# Patient Record
Sex: FEMALE | Race: WHITE | HISPANIC OR LATINO | Employment: FULL TIME | URBAN - NONMETROPOLITAN AREA
[De-identification: names, ages, dates, MRNs, and addresses within clinical notes are randomized per-mention and may not be internally consistent; named-entity substitution may affect disease eponyms.]

---

## 2022-10-20 ENCOUNTER — OFFICE VISIT (OUTPATIENT)
Dept: URGENT CARE | Facility: CLINIC | Age: 27
End: 2022-10-20
Payer: COMMERCIAL

## 2022-10-20 VITALS
RESPIRATION RATE: 17 BRPM | OXYGEN SATURATION: 98 % | TEMPERATURE: 99.1 F | SYSTOLIC BLOOD PRESSURE: 130 MMHG | DIASTOLIC BLOOD PRESSURE: 70 MMHG | HEART RATE: 98 BPM

## 2022-10-20 DIAGNOSIS — J02.9 SORE THROAT: Primary | ICD-10-CM

## 2022-10-20 PROCEDURE — 96372 THER/PROPH/DIAG INJ SC/IM: CPT | Performed by: PHYSICIAN ASSISTANT

## 2022-10-20 PROCEDURE — 99214 OFFICE O/P EST MOD 30 MIN: CPT | Performed by: PHYSICIAN ASSISTANT

## 2022-10-20 RX ORDER — METHYLPREDNISOLONE SODIUM SUCCINATE 125 MG/2ML
125 INJECTION, POWDER, LYOPHILIZED, FOR SOLUTION INTRAMUSCULAR; INTRAVENOUS ONCE
Status: COMPLETED | OUTPATIENT
Start: 2022-10-20 | End: 2022-10-20

## 2022-10-20 RX ORDER — FEXOFENADINE HCL 180 MG/1
180 TABLET ORAL EVERY MORNING
COMMUNITY
Start: 2022-09-29

## 2022-10-20 RX ORDER — AZELASTINE HCL 205.5 UG/1
SPRAY NASAL
COMMUNITY
Start: 2022-09-08

## 2022-10-20 RX ADMIN — METHYLPREDNISOLONE SODIUM SUCCINATE 125 MG: 125 INJECTION, POWDER, LYOPHILIZED, FOR SOLUTION INTRAMUSCULAR; INTRAVENOUS at 18:45

## 2022-10-20 NOTE — PATIENT INSTRUCTIONS
Salt water gargles  Tylenol for discomfort  Closely monitor for worsening symptoms  If you have difficulty breathing or swallowing, administer epinephrine and call 911 ASAP  Follow up with PCP in 3-5 days  Proceed to  ER if symptoms worsen

## 2022-10-20 NOTE — PROGRESS NOTES
[facility]        NAME: Damian May is a 24 y o  female  : 1995    MRN: [id number]  DATE: 2022  TIME: 6:50 PM    Assessment and Plan   Sore throat [J02 9]  1  Sore throat  methylPREDNISolone sodium succinate (Solu-MEDROL) injection 125 mg     Patient is talking normally, NAD, and denies difficulty breathing or swallowing  States this occurred slowly and doesn't feel like an anaphylactic reaction  Discussed risk of possible anaphylactic reaction  Recommended treatment and ambulance transfer to ED  Additionally discussed the possibility of early viral pharyngitis symptoms and lymph node swelling as this may cause a sensation of throat swelling  Throat and lung examination normal  Explained to patient that we can trial with a IM injection of a steroid  Patient has an epi pen that she keeps with her at all times  She knows how to use it  Patient would like to try the steroid and will monitor for worsening symptoms  Additionally recommended she have her sister stay with her tonight  If symptoms worsen, she knows to administer epi pen ASAP and then call 911  Solumedrol administered in office  Patient Instructions     Salt water gargles  Tylenol for discomfort  Closely monitor for worsening symptoms  If you have difficulty breathing or swallowing, administer epinephrine and call 911 ASAP  Follow up with PCP in 3-5 days  Proceed to  ER if symptoms worsen  Chief Complaint     Chief Complaint   Patient presents with   • Swelling     Throat swelling, difficulty swallowing liquids, feels like I got kicked in throat  No new detergents, or medication           History of Present Illness       Patient states she woke up this morning with the feeling like "my lymph nodes were swollen"  Reports gradual onset of worsening swelling sensation neck/back of tongue  Reports discomfort stating, "I feel like I got kicked in the throat"   She is now experiencing discomfort while swallowing liquids but she is able to swallow  Additionally reports using flonase this morning, stating it may have caused irritation as it dripped down the back of her throat  Denies current lip swelling, rashes, itching, drooling, difficulty breathing, dizziness, heart palpitations, chest pain, nausea, vomiting, fever or other URI sx  Reports anaphylactic reaction to peanuts, tree nuts and kiwi extract but does not automatically recall consuming anything with those allergens  She took allegra for symptoms today  States this does not feel like an anaphylactic reactions  Denies new medications or foods  No recent surgeries where intubated  Denies hx/o angioedema  Review of Systems   Review of Systems   Constitutional: Negative for chills, fatigue and fever  HENT: Positive for sore throat  Negative for congestion, ear discharge, ear pain, postnasal drip, rhinorrhea, sinus pressure, sinus pain, sneezing and trouble swallowing  Respiratory: Negative for cough, chest tightness, shortness of breath and wheezing  Gastrointestinal: Negative for abdominal pain, diarrhea, nausea and vomiting  Musculoskeletal: Negative for myalgias  Skin: Negative for rash  Neurological: Negative for light-headedness and headaches  Current Medications       Current Outpatient Medications:   •  Azelastine HCl 0 15 % SOLN, ADMINISTER 1 SPRAY 2 TIMES A DAY IN NOSTRIL AS NEEDED FOR RHINITIS ( ALLERGIES OR CONGESTION), Disp: , Rfl:   •  fexofenadine (ALLEGRA) 180 MG tablet, Take 180 mg by mouth every morning, Disp: , Rfl:   No current facility-administered medications for this visit      Current Allergies     Allergies as of 10/20/2022 - Reviewed 10/20/2022   Allergen Reaction Noted   • Kiwi extract - food allergy Anaphylaxis 10/20/2022   • Nuts - food allergy Anaphylaxis 01/15/2020   • Peanut-containing drug products - food allergy Anaphylaxis 10/20/2022            The following portions of the patient's history were reviewed and updated as appropriate: allergies, current medications, past family history, past medical history, past social history, past surgical history and problem list      Past Medical History:   Diagnosis Date   • Allergic        Past Surgical History:   Procedure Laterality Date   • WISDOM TOOTH EXTRACTION         No family history on file  Medications have been verified  Objective   /70   Pulse 98   Temp 99 1 °F (37 3 °C)   Resp 17   SpO2 98%   No LMP recorded  Physical Exam     Physical Exam  Vitals reviewed  Constitutional:       General: She is not in acute distress  Appearance: She is well-developed  She is not diaphoretic  HENT:      Head: Normocephalic and atraumatic  Nose: Nose normal       Mouth/Throat:      Mouth: Mucous membranes are moist       Pharynx: Oropharynx is clear  No oropharyngeal exudate or posterior oropharyngeal erythema  Comments: No tonsillar hypertrophy, exudate or uvular deviation  Cardiovascular:      Rate and Rhythm: Normal rate and regular rhythm  Heart sounds: Normal heart sounds  No murmur heard  No friction rub  No gallop  Pulmonary:      Effort: Pulmonary effort is normal  No respiratory distress  Breath sounds: Normal breath sounds  No wheezing or rales  Chest:      Chest wall: No tenderness  Musculoskeletal:      Cervical back: Normal range of motion  Lymphadenopathy:      Cervical: No cervical adenopathy  Skin:     General: Skin is warm  Findings: No rash  Neurological:      Mental Status: She is alert  Psychiatric:         Behavior: Behavior normal          Thought Content:  Thought content normal          Judgment: Judgment normal

## 2023-12-22 ENCOUNTER — OFFICE VISIT (OUTPATIENT)
Dept: URGENT CARE | Facility: CLINIC | Age: 28
End: 2023-12-22
Payer: COMMERCIAL

## 2023-12-22 VITALS
OXYGEN SATURATION: 97 % | WEIGHT: 219.8 LBS | BODY MASS INDEX: 33.31 KG/M2 | HEART RATE: 80 BPM | RESPIRATION RATE: 18 BRPM | DIASTOLIC BLOOD PRESSURE: 80 MMHG | TEMPERATURE: 98.1 F | HEIGHT: 68 IN | SYSTOLIC BLOOD PRESSURE: 114 MMHG

## 2023-12-22 DIAGNOSIS — N39.0 URINARY TRACT INFECTION WITHOUT HEMATURIA, SITE UNSPECIFIED: ICD-10-CM

## 2023-12-22 DIAGNOSIS — R35.0 FREQUENT URINATION: Primary | ICD-10-CM

## 2023-12-22 LAB
SL AMB  POCT GLUCOSE, UA: ABNORMAL
SL AMB LEUKOCYTE ESTERASE,UA: ABNORMAL
SL AMB POCT BILIRUBIN,UA: ABNORMAL
SL AMB POCT BLOOD,UA: ABNORMAL
SL AMB POCT CLARITY,UA: ABNORMAL
SL AMB POCT COLOR,UA: YELLOW
SL AMB POCT KETONES,UA: ABNORMAL
SL AMB POCT NITRITE,UA: ABNORMAL
SL AMB POCT PH,UA: 6.5
SL AMB POCT SPECIFIC GRAVITY,UA: 1
SL AMB POCT URINE PROTEIN: ABNORMAL
SL AMB POCT UROBILINOGEN: 4

## 2023-12-22 PROCEDURE — 87077 CULTURE AEROBIC IDENTIFY: CPT

## 2023-12-22 PROCEDURE — 87591 N.GONORRHOEAE DNA AMP PROB: CPT

## 2023-12-22 PROCEDURE — 87491 CHLMYD TRACH DNA AMP PROBE: CPT

## 2023-12-22 PROCEDURE — 99213 OFFICE O/P EST LOW 20 MIN: CPT

## 2023-12-22 PROCEDURE — 81002 URINALYSIS NONAUTO W/O SCOPE: CPT

## 2023-12-22 PROCEDURE — 87186 SC STD MICRODIL/AGAR DIL: CPT

## 2023-12-22 PROCEDURE — 87086 URINE CULTURE/COLONY COUNT: CPT

## 2023-12-22 RX ORDER — NITROFURANTOIN 25; 75 MG/1; MG/1
100 CAPSULE ORAL 2 TIMES DAILY
Qty: 10 CAPSULE | Refills: 0 | Status: SHIPPED | OUTPATIENT
Start: 2023-12-22 | End: 2023-12-27

## 2023-12-22 RX ORDER — PROPRANOLOL HYDROCHLORIDE 10 MG/1
10 TABLET ORAL
COMMUNITY
Start: 2023-12-11

## 2023-12-22 RX ORDER — PHENOL 1.4 %
AEROSOL, SPRAY (ML) MUCOUS MEMBRANE
COMMUNITY

## 2023-12-22 RX ORDER — OMEPRAZOLE 20 MG/1
CAPSULE, DELAYED RELEASE ORAL
COMMUNITY
Start: 2023-09-06

## 2023-12-22 RX ORDER — CYANOCOBALAMIN 1000 UG/ML
1000 INJECTION, SOLUTION INTRAMUSCULAR; SUBCUTANEOUS
COMMUNITY
Start: 2023-12-19 | End: 2025-02-11

## 2023-12-22 NOTE — PROGRESS NOTES
[facility]        NAME: Dafne Huerta is a 28 y.o. female  : 1995    MRN: [id number]  DATE: 2023  TIME: 6:02 PM    Assessment and Plan   Frequent urination [R35.0]  1. Frequent urination  POCT urine dip    Urine culture    Chlamydia/GC amplified DNA by PCR    Trichomonas vaginalis/Mycoplasma genitalium PCR    nitrofurantoin (MACROBID) 100 mg capsule      2. Urinary tract infection without hematuria, site unspecified  nitrofurantoin (MACROBID) 100 mg capsule        Burning with urination, odor in urination, and frequent urination.  Symptoms began on .  Recently had sexual intercourse with a new partner.  Unsure if he had symptoms.  She would like to be tested for STDs.  She denies any vaginal discharge or pelvic pain.  Just got over her last normal menstrual period, denies pregnancy.  Sending for urine culture, chlamydia/gonorrhea, and trichomoniasis vaginalis/mycoplasma genitalium.  Advise follow-up family doctor.  Advised to go to the ER if symptoms worsen.    Patient Instructions     Take prescribed antibiotic as instructed.  Make sure to drink plenty of fluids and rest.  May try over-the-counter Azo for bladder discomfort.  Avoid sexual activities until STD testing returns.  Check MyChart in 2 days for results of urine culture and STDs.  Will be out of the office until .  You may call our office on , we will be open 8 AM to 4 PM.  Any symptoms worsen, go to the emergency room.  Follow-up with family doctor.  Follow up with PCP in 3-5 days.  Proceed to  ER if symptoms worsen.    Chief Complaint     Chief Complaint   Patient presents with    Possible UTI     Symptoms started this morning and had a strong odor, burning and frequent urine. Had a sex partner on Tuesday and she is worried was wanting to get tested for stds. Last menstrual cycle was last Tuesday.          History of Present Illness       28-year-old female presents to the clinic for urinary frequency,  urgency, foul odor, and burning that began on 12/19.  History of UTIs in the past.  States that Macrobid works well for her.  Admits to new sexual partner on Tuesday.  Would like to be tested for STDs.  Denies any pelvic pain or vaginal discharge.  PT states that she is unsure of her partner having symptoms.  Denies any fever, chills, chest pain, shortness of breath, Marshall pain, flank pain, hematuria.    Difficulty Urinating   This is a new problem. The current episode started today. The problem occurs every urination. The problem has been gradually worsening. The quality of the pain is described as shooting and stabbing. The pain is at a severity of 6/10. There has been no fever. She is Sexually active. There is No history of pyelonephritis. Associated symptoms include a discharge, frequency, hesitancy and urgency. Pertinent negatives include no chills, flank pain, hematuria, nausea, possible pregnancy or vomiting.       Review of Systems   Review of Systems   Constitutional: Negative.  Negative for chills.   HENT: Negative.     Respiratory: Negative.     Cardiovascular: Negative.    Gastrointestinal: Negative.  Negative for nausea and vomiting.   Genitourinary:  Positive for dysuria, frequency, hesitancy and urgency. Negative for flank pain and hematuria.   Musculoskeletal: Negative.    Skin: Negative.    Neurological: Negative.          Current Medications       Current Outpatient Medications:     Azelastine HCl 0.15 % SOLN, ADMINISTER 1 SPRAY 2 TIMES A DAY IN NOSTRIL AS NEEDED FOR RHINITIS ( ALLERGIES OR CONGESTION), Disp: , Rfl:     cyanocobalamin 1,000 mcg/mL, Inject 1,000 mcg into a muscle, Disp: , Rfl:     fexofenadine (ALLEGRA) 180 MG tablet, Take 180 mg by mouth every morning, Disp: , Rfl:     nitrofurantoin (MACROBID) 100 mg capsule, Take 1 capsule (100 mg total) by mouth 2 (two) times a day for 5 days, Disp: 10 capsule, Rfl: 0    omeprazole (PriLOSEC) 20 mg delayed release capsule, Take 1 cap by mouth  "daily for 90 days after surgery., Disp: , Rfl:     propranolol (INDERAL) 10 mg tablet, Take 10 mg by mouth, Disp: , Rfl:     Multiple Vitamins-Minerals (Multivitamin Women) TABS, Take by mouth, Disp: , Rfl:     Current Allergies     Allergies as of 12/22/2023 - Reviewed 12/22/2023   Allergen Reaction Noted    Kiwi extract - food allergy Anaphylaxis 10/20/2022    Nuts - food allergy Anaphylaxis 01/15/2020    Peanut-containing drug products - food allergy Anaphylaxis 10/20/2022            The following portions of the patient's history were reviewed and updated as appropriate: allergies, current medications, past family history, past medical history, past social history, past surgical history and problem list.     Past Medical History:   Diagnosis Date    Allergic        Past Surgical History:   Procedure Laterality Date    WISDOM TOOTH EXTRACTION         History reviewed. No pertinent family history.      Medications have been verified.        Objective   /80   Pulse 80   Temp 98.1 °F (36.7 °C)   Resp 18   Ht 5' 8\" (1.727 m)   Wt 99.7 kg (219 lb 12.8 oz)   SpO2 97%   BMI 33.42 kg/m²        Physical Exam     Physical Exam  Constitutional:       General: She is awake. She is not in acute distress.     Appearance: Normal appearance. She is not ill-appearing, toxic-appearing or diaphoretic.   HENT:      Head: Normocephalic and atraumatic.      Mouth/Throat:      Mouth: Mucous membranes are moist.      Pharynx: Oropharynx is clear. No posterior oropharyngeal erythema.   Eyes:      Extraocular Movements: Extraocular movements intact.      Pupils: Pupils are equal, round, and reactive to light.   Cardiovascular:      Rate and Rhythm: Normal rate and regular rhythm.      Pulses: Normal pulses.      Heart sounds: Normal heart sounds.   Pulmonary:      Effort: Pulmonary effort is normal. No respiratory distress.      Breath sounds: Normal breath sounds.   Abdominal:      General: Abdomen is flat. Bowel sounds are " normal. There is no distension.      Palpations: Abdomen is soft.      Tenderness: There is no abdominal tenderness. There is no right CVA tenderness, left CVA tenderness, guarding or rebound.   Musculoskeletal:      Cervical back: Normal range of motion and neck supple.   Lymphadenopathy:      Cervical: No cervical adenopathy.   Skin:     General: Skin is warm and dry.      Capillary Refill: Capillary refill takes less than 2 seconds.   Neurological:      General: No focal deficit present.      Mental Status: She is alert and easily aroused. Mental status is at baseline.   Psychiatric:         Mood and Affect: Mood normal.         Behavior: Behavior normal. Behavior is cooperative.

## 2023-12-22 NOTE — PATIENT INSTRUCTIONS
Take prescribed antibiotic as instructed.  Make sure to drink plenty of fluids and rest.  May try over-the-counter Azo for bladder discomfort.  Avoid sexual activities until STD testing returns.  Check MyChart in 2 days for results of urine culture and STDs.  Will be out of the office until 12/26.  You may call our office on 12/24, we will be open 8 AM to 4 PM.  Any symptoms worsen, go to the emergency room.  Follow-up with family doctor.  Follow up with PCP in 3-5 days.  Proceed to  ER if symptoms worsen.  Urinary Tract Infection in Women   WHAT YOU NEED TO KNOW:   A urinary tract infection (UTI) is caused by bacteria that get inside your urinary tract. Your urinary tract includes your kidneys, ureters, bladder, and urethra. A UTI is more common in your lower urinary tract, which includes your bladder and urethra.       DISCHARGE INSTRUCTIONS:   Return to the emergency department if:   You are urinating very little or not at all.    You have a high fever with shaking chills.    You have side or back pain that gets worse.    Call your doctor if:   You have a fever.    You do not feel better after 2 days of taking antibiotics.    You have new symptoms, such as blood or pus in your urine.    You are vomiting.    You have questions or concerns about your condition or care.    Medicines:   Antibiotics  treat a bacterial infection. If you have UTIs often (called recurrent UTIs), you may be given antibiotics to take regularly. You will be given directions for when and how to use antibiotics. The goal is to prevent UTIs but not cause antibiotic resistance by using antibiotics too often.    Medicines  may be given to decrease pain and burning when you urinate. They will also help decrease the feeling that you need to urinate often. These medicines may make your urine orange or red.    Take your medicine as directed.  Contact your healthcare provider if you think your medicine is not helping or if you have side effects. Tell  your provider if you are allergic to any medicine. Keep a list of the medicines, vitamins, and herbs you take. Include the amounts, and when and why you take them. Bring the list or the pill bottles to follow-up visits. Carry your medicine list with you in case of an emergency.    Follow up with your doctor as directed:  Write down your questions so you remember to ask them during your visits.  Prevent another UTI:   Empty your bladder often.  Urinate and empty your bladder as soon as you feel the need. Do not hold your urine for long periods of time.    Wipe from front to back after you urinate or have a bowel movement.  This will help prevent germs from getting into your urinary tract through your urethra.    Drink liquids as directed.  Ask how much liquid to drink each day and which liquids are best for you. You may need to drink more liquids than usual to help flush out the bacteria. Do not drink alcohol, caffeine, or citrus juices. These can irritate your bladder and increase your symptoms. Your healthcare provider may recommend cranberry juice to help prevent a UTI.    Urinate before and after you have sex.  This can help flush out bacteria passed during sex.    Do not douche or use feminine deodorants.  These can change the chemical balance in your vagina.    Change sanitary pads or tampons often.  This will help prevent germs from getting into your urinary tract.    Talk to your healthcare provider about your birth control method.  You may need to change your method if it is increasing your risk for UTIs.    Wear cotton underwear and clothes that are loose.  Tight pants and nylon underwear can trap moisture and cause bacteria to grow.    Vaginal estrogen may be recommended.  This medicine helps prevent UTIs in women who have gone through menopause or are in moshe-menopause.    Do pelvic muscle exercises often.  Pelvic muscle exercises may help you start and stop urinating. Strong pelvic muscles may help you  empty your bladder easier. Squeeze these muscles tightly for 5 seconds like you are trying to hold back urine. Then relax for 5 seconds. Gradually work up to squeezing for 10 seconds. Do 3 sets of 15 repetitions a day, or as directed.    © Copyright [employer] 2023 Information is for End User's use only and may not be sold, redistributed or otherwise used for commercial purposes.  The above information is an  only. It is not intended as medical advice for individual conditions or treatments. Talk to your doctor, nurse or pharmacist before following any medical regimen to see if it is safe and effective for you.

## 2023-12-23 LAB
C TRACH DNA SPEC QL NAA+PROBE: NEGATIVE
N GONORRHOEA DNA SPEC QL NAA+PROBE: NEGATIVE

## 2023-12-24 LAB — BACTERIA UR CULT: ABNORMAL

## 2024-06-07 ENCOUNTER — OFFICE VISIT (OUTPATIENT)
Dept: URGENT CARE | Facility: CLINIC | Age: 29
End: 2024-06-07
Payer: COMMERCIAL

## 2024-06-07 VITALS — TEMPERATURE: 98 F | HEART RATE: 65 BPM | DIASTOLIC BLOOD PRESSURE: 54 MMHG | SYSTOLIC BLOOD PRESSURE: 103 MMHG

## 2024-06-07 DIAGNOSIS — N30.01 ACUTE CYSTITIS WITH HEMATURIA: Primary | ICD-10-CM

## 2024-06-07 LAB
SL AMB  POCT GLUCOSE, UA: NEGATIVE
SL AMB LEUKOCYTE ESTERASE,UA: ABNORMAL
SL AMB POCT BILIRUBIN,UA: NEGATIVE
SL AMB POCT BLOOD,UA: ABNORMAL
SL AMB POCT CLARITY,UA: CLEAR
SL AMB POCT COLOR,UA: YELLOW
SL AMB POCT KETONES,UA: NEGATIVE
SL AMB POCT NITRITE,UA: NEGATIVE
SL AMB POCT PH,UA: 7.5
SL AMB POCT SPECIFIC GRAVITY,UA: 1
SL AMB POCT URINE HCG: NEGATIVE
SL AMB POCT URINE PROTEIN: ABNORMAL
SL AMB POCT UROBILINOGEN: 1

## 2024-06-07 PROCEDURE — 99213 OFFICE O/P EST LOW 20 MIN: CPT | Performed by: NURSE PRACTITIONER

## 2024-06-07 PROCEDURE — 87186 SC STD MICRODIL/AGAR DIL: CPT | Performed by: NURSE PRACTITIONER

## 2024-06-07 PROCEDURE — 87086 URINE CULTURE/COLONY COUNT: CPT | Performed by: NURSE PRACTITIONER

## 2024-06-07 PROCEDURE — 81025 URINE PREGNANCY TEST: CPT | Performed by: NURSE PRACTITIONER

## 2024-06-07 PROCEDURE — 81002 URINALYSIS NONAUTO W/O SCOPE: CPT | Performed by: NURSE PRACTITIONER

## 2024-06-07 PROCEDURE — 87077 CULTURE AEROBIC IDENTIFY: CPT | Performed by: NURSE PRACTITIONER

## 2024-06-07 RX ORDER — NITROFURANTOIN 25; 75 MG/1; MG/1
100 CAPSULE ORAL 2 TIMES DAILY
Qty: 10 CAPSULE | Refills: 0 | Status: SHIPPED | OUTPATIENT
Start: 2024-06-07 | End: 2024-06-12

## 2024-06-07 NOTE — PATIENT INSTRUCTIONS
Take antibiotic as directed.  Recommend drinking plenty of fluids including water and cranberry juice.  Recommend avoiding caffeine or alcohol.  Empty bladder frequently.  If you develop any high fever, severe back pain, abdominal pain, nausea, vomiting or any concerning symptoms please return proceed ER.  Recommend following up with PCP in 3-5 days

## 2024-06-07 NOTE — PROGRESS NOTES
"  [facility]        NAME: Dafne Huerta is a 29 y.o. female  : 1995    MRN: [id number]  DATE: 2024  TIME: 1:46 PM    Assessment and Plan   Acute cystitis with hematuria [N30.01]  1. Acute cystitis with hematuria  POCT urine dip    POCT urine HCG    Urine culture    nitrofurantoin (MACROBID) 100 mg capsule    Urine culture            Patient Instructions     Patient Instructions   Take antibiotic as directed.  Recommend drinking plenty of fluids including water and cranberry juice.  Recommend avoiding caffeine or alcohol.  Empty bladder frequently.  If you develop any high fever, severe back pain, abdominal pain, nausea, vomiting or any concerning symptoms please return proceed ER.  Recommend following up with PCP in 3-5 days        If tests have been performed at [facility], our office will contact you with results if changes need to be made to the care plan discussed with you at the visit.  [facility] on [facility]    Chief Complaint     Chief Complaint   Patient presents with    Urinary Tract Infection     C/o urinary frequency, dysuria, and odor onset \"last night\". Denies flank/lower back pain. Denies any visible hematuria. Denies fever. Denies any OTC medications.         History of Present Illness       Difficulty Urinating   This is a recurrent problem. The current episode started yesterday. The problem occurs every urination. The problem has been rapidly worsening. The quality of the pain is described as burning. The pain is at a severity of 7/10. The pain is moderate. There has been no fever. She is Sexually active. There is No history of pyelonephritis. Associated symptoms include frequency, hematuria and urgency. Pertinent negatives include no chills, discharge, flank pain, hesitancy, nausea, sweats or vomiting. She has tried nothing for the symptoms. The treatment provided no relief. There is no history of kidney stones or recurrent UTIs.       Review " of Systems   Review of Systems   Constitutional:  Negative for chills, diaphoresis, fatigue and fever.   Respiratory:  Negative for cough, chest tightness and shortness of breath.    Cardiovascular:  Negative for chest pain and palpitations.   Gastrointestinal:  Negative for abdominal pain, diarrhea, nausea and vomiting.   Genitourinary:  Positive for dysuria, frequency, hematuria and urgency. Negative for decreased urine volume, difficulty urinating, flank pain, hesitancy, pelvic pain, vaginal bleeding, vaginal discharge and vaginal pain.   Musculoskeletal:  Negative for back pain, joint swelling, myalgias, neck pain and neck stiffness.   Skin:  Negative for rash.   Neurological:  Negative for dizziness, weakness, numbness and headaches.         Current Medications       Current Outpatient Medications:     nitrofurantoin (MACROBID) 100 mg capsule, Take 1 capsule (100 mg total) by mouth 2 (two) times a day for 5 days, Disp: 10 capsule, Rfl: 0    Azelastine HCl 0.15 % SOLN, ADMINISTER 1 SPRAY 2 TIMES A DAY IN NOSTRIL AS NEEDED FOR RHINITIS ( ALLERGIES OR CONGESTION), Disp: , Rfl:     cyanocobalamin 1,000 mcg/mL, Inject 1,000 mcg into a muscle, Disp: , Rfl:     fexofenadine (ALLEGRA) 180 MG tablet, Take 180 mg by mouth every morning, Disp: , Rfl:     Multiple Vitamins-Minerals (Multivitamin Women) TABS, Take by mouth, Disp: , Rfl:     omeprazole (PriLOSEC) 20 mg delayed release capsule, Take 1 cap by mouth daily for 90 days after surgery., Disp: , Rfl:     propranolol (INDERAL) 10 mg tablet, Take 10 mg by mouth, Disp: , Rfl:     Current Allergies     Allergies as of 06/07/2024 - Reviewed 06/07/2024   Allergen Reaction Noted    Kiwi extract - food allergy Anaphylaxis 10/20/2022    Nuts - food allergy Anaphylaxis 01/15/2020    Peanut-containing drug products - food allergy Anaphylaxis 10/20/2022            The following portions of the patient's history were reviewed and updated as appropriate: allergies, current  medications, past family history, past medical history, past social history, past surgical history and problem list.     Past Medical History:   Diagnosis Date    Allergic        Past Surgical History:   Procedure Laterality Date    WISDOM TOOTH EXTRACTION         History reviewed. No pertinent family history.      Medications have been verified.        Objective   /54 (BP Location: Right arm, Patient Position: Sitting)   Pulse 65   Temp 98 °F (36.7 °C) (Temporal)   LMP 05/21/2024 (Exact Date)   Patient's last menstrual period was 05/21/2024 (exact date).       Physical Exam     Physical Exam  Constitutional:       General: She is not in acute distress.     Appearance: Normal appearance. She is well-developed. She is not diaphoretic.   Cardiovascular:      Rate and Rhythm: Normal rate and regular rhythm.      Heart sounds: Normal heart sounds.   Pulmonary:      Effort: Pulmonary effort is normal.      Breath sounds: Normal breath sounds.   Abdominal:      General: Bowel sounds are normal. There is no distension.      Palpations: Abdomen is soft. Abdomen is not rigid. There is no mass.      Tenderness: There is no abdominal tenderness. There is no CVA tenderness, right CVA tenderness, left CVA tenderness, guarding or rebound. Negative signs include Celis's sign and McBurney's sign.   Skin:     General: Skin is warm and dry.   Neurological:      Mental Status: She is alert and oriented to person, place, and time.

## 2024-06-09 LAB
BACTERIA UR CULT: ABNORMAL
BACTERIA UR CULT: ABNORMAL

## 2024-06-11 NOTE — RESULT ENCOUNTER NOTE
Urine Culture   >100,000 cfu/ml Escherichia coli Abnormal   70,000-79,000 cfu/ml  Mixed Contaminants X3    Pt was prescribed nitrofurantoin